# Patient Record
Sex: FEMALE | URBAN - METROPOLITAN AREA
[De-identification: names, ages, dates, MRNs, and addresses within clinical notes are randomized per-mention and may not be internally consistent; named-entity substitution may affect disease eponyms.]

---

## 2022-09-07 ENCOUNTER — OFFICE VISIT (OUTPATIENT)
Dept: URBAN - METROPOLITAN AREA CLINIC 98 | Facility: CLINIC | Age: 87
End: 2022-09-07
Payer: SELF-PAY

## 2022-09-07 VITALS
SYSTOLIC BLOOD PRESSURE: 160 MMHG | BODY MASS INDEX: 21.85 KG/M2 | WEIGHT: 108.4 LBS | HEART RATE: 62 BPM | DIASTOLIC BLOOD PRESSURE: 77 MMHG | HEIGHT: 59 IN | TEMPERATURE: 98.1 F

## 2022-09-07 DIAGNOSIS — K62.89 RECTAL PAIN: ICD-10-CM

## 2022-09-07 PROCEDURE — 99243 OFF/OP CNSLTJ NEW/EST LOW 30: CPT | Performed by: INTERNAL MEDICINE

## 2022-09-07 PROCEDURE — 99203 OFFICE O/P NEW LOW 30 MIN: CPT | Performed by: INTERNAL MEDICINE

## 2022-09-07 RX ORDER — ATORVASTATIN CALCIUM 20 MG/1
1 TABLET TABLET, FILM COATED ORAL ONCE A DAY
Status: ACTIVE | COMMUNITY

## 2022-09-07 RX ORDER — KRILL/OM-3/DHA/EPA/PHOSPHO/AST 1000-230MG
1 TABLET CAPSULE ORAL ONCE A DAY
Status: ACTIVE | COMMUNITY

## 2022-09-07 RX ORDER — DOCUSATE SODIUM 100 MG/1
1 CAPSULE AS NEEDED CAPSULE ORAL ONCE A DAY
Status: ACTIVE | COMMUNITY

## 2022-09-07 RX ORDER — MECLIZINE HYDROCHLORIDE 25 MG/1
1 TABLET AS NEEDED TABLET, CHEWABLE ORAL
Status: ACTIVE | COMMUNITY

## 2022-09-07 NOTE — HPI-TODAY'S VISIT:
Patient referred by Zak Acevedo MD for evaluation of rectal pain. Copy of this consult OV sent to Dr. Acevedo. 89 yo pt who has been having intermittent rectal discomfortmwm" bump on the back " wo diarrhea, constipation, nor rectal bleeding or melenic stools. She denies straining @ stools. She has normal formed bm's, w occasional loose slef - limited loose stools wo bleeding.  No abdominal pain. She has no UGI sxs. Has a good appetite and overall she feels well. Cannot remember when she had a colonoscopy. Today. she reports no rectal pain, just some discomfort. Denies anorexia or weight loss.  No COVID-19 exposure and and has received COVID-19 vaccine. Denies cardiorespiratory sxs. Normal labs 8/22. She reports having a normal A + P CT scan recently. No other complaints.

## 2022-09-07 NOTE — PHYSICAL EXAM GASTROINTESTINAL
Abdomen , soft, obese abdomen, nontender, nondistended , no guarding or rigidity , no masses palpable , normal bowel sounds , Liver and Spleen , no hepatomegaly present , no hepatosplenomegaly , liver nontender , spleen not palpable. BRAD: no External hrrd, no mass and no stool in rectal vault, non-bloody rectal mucus, no perianal discomfort.

## 2022-09-09 ENCOUNTER — DASHBOARD ENCOUNTERS (OUTPATIENT)
Age: 87
End: 2022-09-09

## 2022-10-05 LAB
CALPROTECTIN, FECAL: 93
OCCULT BLOOD, FECAL, IA: NEGATIVE
PANCREATIC ELASTASE, FECAL: 256

## 2023-01-11 ENCOUNTER — OFFICE VISIT (OUTPATIENT)
Dept: URBAN - METROPOLITAN AREA CLINIC 98 | Facility: CLINIC | Age: 88
End: 2023-01-11

## 2023-03-01 ENCOUNTER — OFFICE VISIT (OUTPATIENT)
Dept: URBAN - METROPOLITAN AREA CLINIC 98 | Facility: CLINIC | Age: 88
End: 2023-03-01